# Patient Record
Sex: FEMALE | Race: WHITE | NOT HISPANIC OR LATINO | URBAN - NONMETROPOLITAN AREA
[De-identification: names, ages, dates, MRNs, and addresses within clinical notes are randomized per-mention and may not be internally consistent; named-entity substitution may affect disease eponyms.]

---

## 2017-07-19 PROBLEM — H52.4: Noted: 2017-07-19

## 2017-07-19 PROBLEM — H43.812: Noted: 2017-07-19

## 2017-07-19 PROBLEM — H40.1132: Noted: 2021-12-10

## 2017-07-19 PROBLEM — H35.3132: Noted: 2021-12-10

## 2017-07-19 PROBLEM — E11.3311: Noted: 2021-12-10

## 2019-01-03 ENCOUNTER — IMPORTED ENCOUNTER (OUTPATIENT)
Dept: URBAN - NONMETROPOLITAN AREA CLINIC 1 | Facility: CLINIC | Age: 84
End: 2019-01-03

## 2019-01-03 PROCEDURE — 92083 EXTENDED VISUAL FIELD XM: CPT

## 2019-01-03 PROCEDURE — 92014 COMPRE OPH EXAM EST PT 1/>: CPT

## 2019-01-03 NOTE — PATIENT DISCUSSION
POAG OU: moderate -  Discussed diagnosis in detail w/ pt today-  OCT of disc done previously OD stable with good RNFL. UTO good quality OCT OS. - VF done today OD Dulce no defects OS poor Non- Specific-  Optos done previously: stable cupping OU-  Gonio done previously: grade lV with light pigment OU-  IOP  today 15 OD aand 16 OS. -  Continue Latanoprost OU QHS-  Continue Combigan OU BID-  D/c Timolol at this time not controllign IOP well-  Cannot use Cosopt due to sulfa allergy-  Disc asymmetry noted with CDs of 0.55 OD and 0.4 OS. [facility] COD done previously was 18 OD and 558 OS. 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 -  Mild BDR noted OU but stable from previous. -  Stressed importance of blood sugar control and regularly monitoring with PCP-  Continue to monitor every 4-6 months. 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 -  Continue eye vitamins daily highly encouraged. -  Continue to use AG daily call or come in ASAP if changes in vision are noted from today. -  Continue to monitor every 4-6 months. Hyperopia/astig/presby-  Discussed refractive status in detail w/ pt-  MR done new GLRx given but update not required-  Continue to monitor yearly or PRNPVD and  descemet fold OS-  Discussed findings of exam in detail with the patient. -  The risk of retinal detachment in patients with PVDs was discussed with the patient and the warning signs of retinal detachment were carefully reviewed with the patient. -  The patient was warned to return to the office or contact the ophthalmologist on call immediately if they experience signs of retinal detachment or changes in vision noted from today.  -  continue to monitor; 's Notes:  9/5/18DFE  9/5/18Optos 4/20/18OCT disc  9/5/18OCT mac  12/15/17 9/5/18VF  1/3/19Gonio 4/20/18PACH 9/15/15 - [facility] 2/28/17 - no longer requiredStereo Disc

## 2019-06-18 ENCOUNTER — IMPORTED ENCOUNTER (OUTPATIENT)
Dept: URBAN - NONMETROPOLITAN AREA CLINIC 1 | Facility: CLINIC | Age: 84
End: 2019-06-18

## 2019-06-18 PROCEDURE — 92014 COMPRE OPH EXAM EST PT 1/>: CPT

## 2019-06-18 PROCEDURE — 92250 FUNDUS PHOTOGRAPHY W/I&R: CPT

## 2019-06-18 NOTE — PATIENT DISCUSSION
POAG OU: moderate -  Discussed diagnosis in detail w/ pt today-  OCT of disc done previously OD stable with good RNFL. UTO good quality OCT OS. - VF done previously OD Dulce no defects OS poor Non- Specific-  Optos done today: stable cupping OU-  Gonio done previously: grade lV with light pigment OU-  PACH done previously was 554 OD and 558 OS. -  IOP  today 13 OU-  Cup to Disc noted at OD 0.55 and OS 0.4  -  Continue Latanoprost OU QHS-  Continue Combigan OU BID-  D/C Timolol at this time not controllign IOP well-  Cannot use Cosopt due to sulfa allergy-  RTC 4 Months POAG ARMD w/ OCTType II DM dx 1980s w/ mod NPDR OU-  Discussed diagnosis in detail w/ pt today-  Dx 1980s controlled with oral medication-  Appears stable at this time. 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 -  Continue to monitor every 4-6 months. Hyperopia/astig/presby-  Discussed refractive status in detail w/ pt-  Continue to monitor yearly or PRNPVD and  descemet fold OS-  Discussed findings of exam in detail with the patient. -  The risk of retinal detachment in patients with PVDs was discussed with the patient and the warning signs of retinal detachment were carefully reviewed with the patient. -  The patient was warned to return to the office or contact the ophthalmologist on call immediately if they experience signs of retinal detachment or changes in vision noted from today.  -  Continue to monitor; 's Notes: MR 9/5/18DFE  6/18/19Optos 6/18/19OCT disc  9/5/18OCT mac  12/15/17 9/5/18VF  1/3/19Gonio 4/20/18PACH 9/15/15 - [facility] 2/28/17 - no longer requiredStereo Disc

## 2019-10-22 ENCOUNTER — IMPORTED ENCOUNTER (OUTPATIENT)
Dept: URBAN - NONMETROPOLITAN AREA CLINIC 1 | Facility: CLINIC | Age: 84
End: 2019-10-22

## 2019-10-22 PROCEDURE — 92014 COMPRE OPH EXAM EST PT 1/>: CPT

## 2019-10-22 PROCEDURE — 92134 CPTRZ OPH DX IMG PST SGM RTA: CPT

## 2019-10-22 NOTE — PATIENT DISCUSSION
POAG OU: moderate -  Discussed diagnosis in detail w/ pt today-  OCT of disc done previously OD stable with good RNFL. UTO good quality OCT OS. - VF done previously OD Dulce no defects OS poor Non- Specific-  Optos done previously: stable cupping OU-  Gonio done previously: grade lV with light pigment OU-  PACH done previously was 554 OD and 558 OS. -  IOP  today 13 OU-  Cup to Disc noted at OD 0.55 and OS 0.4  -  Continue Latanoprost OU QHS-  Continue Combigan OU BID-  D/C Timolol at this time not controllign IOP well-  Cannot use Cosopt due to sulfa allergy-  RTC 4 Months POAG ARMD w/ VFType II DM dx 1980s w/ mod NPDR OU-  Discussed diagnosis in detail w/ pt today-  Dx 1980s controlled with oral medication-  Appears stable at this time. 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 -  OCT done today shows Drusen OU but stable from previous -  Continue to monitor Hyperopia/astig/presby-  Discussed refractive status in detail w/ pt-  Continue to monitor yearly or PRNPVD and  descemet fold OS-  Discussed findings of exam in detail with the patient. -  The risk of retinal detachment in patients with PVDs was discussed with the patient and the warning signs of retinal detachment were carefully reviewed with the patient. -  The patient was warned to return to the office or contact the ophthalmologist on call immediately if they experience signs of retinal detachment or changes in vision noted from today.  -  Continue to monitor; 's Notes: MR 9/5/18DFE  6/18/19Optos 6/18/19OCT disc  9/5/18OCT mac  10/22/19VF  1/3/19Gonio 4/20/18PACH 9/15/15 - [facility] 2/28/17 - no longer requiredStereo Disc

## 2020-11-20 ENCOUNTER — IMPORTED ENCOUNTER (OUTPATIENT)
Dept: URBAN - NONMETROPOLITAN AREA CLINIC 1 | Facility: CLINIC | Age: 85
End: 2020-11-20

## 2020-11-20 PROCEDURE — 99213 OFFICE O/P EST LOW 20 MIN: CPT

## 2020-11-20 PROCEDURE — 92083 EXTENDED VISUAL FIELD XM: CPT

## 2020-11-20 NOTE — PATIENT DISCUSSION
POAG OU: moderate -  Discussed diagnosis in detail w/ pt today-  OCT of disc done previously OD stable with good RNFL. UTO good quality OCT OS. - VF done today OD Good reliability with borderline enlarged blind spot SO good with borderline central scotoma-  Optos done previously: stable cupping OU-  Gonio done previously: grade lV with light pigment OU-  PACH done previously was 554 OD and 558 OS. -  IOP  today 14 OD and 16 OS -  Cup to Disc noted at OD 0.55 and OS 0.4  -  Continue Latanoprost OU QHS-  Continue Combigan OU BID-  D/C Timolol at this time not controllign IOP well-  Cannot use Cosopt due to sulfa allergyType II DM dx 1980s w/ mod NPDR OU-  Discussed diagnosis in detail w/ pt today-  Dx 1980s controlled with oral medication-  Appears stable at this time. 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 -  OCT done previous shows Drusen OU but stable from previous -  Continue to monitor Hyperopia/astig/presby-  Discussed refractive status in detail w/ pt-  Continue to monitor yearly or PRNPVD and  descemet fold OS-  Discussed findings of exam in detail with the patient. -  The risk of retinal detachment in patients with PVDs was discussed with the patient and the warning signs of retinal detachment were carefully reviewed with the patient. -  The patient was warned to return to the office or contact the ophthalmologist on call immediately if they experience signs of retinal detachment or changes in vision noted from today.  -  Continue to monitor; 's Notes: MR 9/5/18DFE  6/18/19Optos 6/18/19OCT disc  9/5/18OCT mac  10/22/19VF  11/20/20Gonio 4/20/18PACH 9/15/15 - [facility] 2/28/17 - no longer requiredStereo Disc

## 2021-04-06 ENCOUNTER — IMPORTED ENCOUNTER (OUTPATIENT)
Dept: URBAN - NONMETROPOLITAN AREA CLINIC 1 | Facility: CLINIC | Age: 86
End: 2021-04-06

## 2021-04-06 PROCEDURE — 92133 CPTRZD OPH DX IMG PST SGM ON: CPT

## 2021-04-06 PROCEDURE — 99214 OFFICE O/P EST MOD 30 MIN: CPT

## 2021-04-06 PROCEDURE — 92015 DETERMINE REFRACTIVE STATE: CPT

## 2021-04-06 NOTE — PATIENT DISCUSSION
POAG OU: moderate -  Discussed diagnosis in detail w/ pt today-  OCT of disc done today OD shows Borderline Superiro NFLthinning and OS shows BorderlineSuperior and Nasal NFL Thinning - VF done previously OD Good reliability with borderline enlarged blind spot SO good with borderline central scotoma-  Optos done previously: stable cupping OU-  Gonio done previously: grade lV with light pigment OU-  PACH done previously was 554 OD and 558 OS. 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 -  OCT done previous shows Drusen OU but stable from previous -  Continue to monitor Hyperopia/astig/presby-  Discussed refractive status in detail w/ pt-  Continue to monitor yearly or PRNPVD and  descemet fold OS-  Discussed findings of exam in detail with the patient. -  The risk of retinal detachment in patients with PVDs was discussed with the patient and the warning signs of retinal detachment were carefully reviewed with the patient. -  The patient was warned to return to the office or contact the ophthalmologist on call immediately if they experience signs of retinal detachment or changes in vision noted from today.  -  Continue to monitor; 's Notes: MR 9/5/18DFE  6/18/19Optos 6/18/19OCT disc 4/6/21OCT mac  10/22/19VF  11/20/20Gonio 4/20/18PACH 9/15/15 - [facility] 2/28/17 - no longer requiredStereo Disc

## 2021-08-09 ENCOUNTER — IMPORTED ENCOUNTER (OUTPATIENT)
Dept: URBAN - NONMETROPOLITAN AREA CLINIC 1 | Facility: CLINIC | Age: 86
End: 2021-08-09

## 2021-08-09 PROCEDURE — 99213 OFFICE O/P EST LOW 20 MIN: CPT

## 2021-08-09 PROCEDURE — 92250 FUNDUS PHOTOGRAPHY W/I&R: CPT

## 2021-08-09 NOTE — PATIENT DISCUSSION
POAG OU: moderate -  Discussed diagnosis in detail w/ pt today-  OCT done previously OD shows Borderline Superiro NFLthinning and OS shows BorderlineSuperior and Nasal NFL Thinning - VF done previously OD Good reliability with borderline enlarged blind spot SO good with borderline central scotoma-  Optos done today: stable cupping OU-  Gonio done previously: grade lV with light pigment OU-  PACH done previously was 554 OD and 558 OS. 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 -  OCT done previous shows Drusen OU but stable from previous -  Continue to monitor Hyperopia/astig/presby-  Discussed refractive status in detail w/ pt-  Continue to monitor yearly or PRNPVD and  descemet fold OS-  Discussed findings of exam in detail with the patient. -  The risk of retinal detachment in patients with PVDs was discussed with the patient and the warning signs of retinal detachment were carefully reviewed with the patient. -  The patient was warned to return to the office or contact the ophthalmologist on call immediately if they experience signs of retinal detachment or changes in vision noted from today.  -  Continue to monitor; Dr's Notes: MR 9/5/18DFE  6/18/19Optos 8/9/21OCT disc 4/6/21OCT mac  10/22/19VF  11/20/20Gonio 4/20/18PACH 9/15/15 - [facility] 2/28/17 - no longer requiredStereo Disc

## 2021-12-10 ENCOUNTER — IMPORTED ENCOUNTER (OUTPATIENT)
Dept: URBAN - NONMETROPOLITAN AREA CLINIC 1 | Facility: CLINIC | Age: 86
End: 2021-12-10

## 2021-12-10 PROCEDURE — 92134 CPTRZ OPH DX IMG PST SGM RTA: CPT

## 2021-12-10 PROCEDURE — 99214 OFFICE O/P EST MOD 30 MIN: CPT

## 2021-12-10 NOTE — PATIENT DISCUSSION
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 -  OCT done today shows Drusen and thickening OU -  Recommend Retinal consult with [facility] patient agrees with plan -  Continue to monitor POAG OU: moderate -  Discussed diagnosis in detail w/ pt today-  OCT done previously OD shows Borderline Superiro NFLthinning and OS shows BorderlineSuperior and Nasal NFL Thinning - VF done previously OD Good reliability with borderline enlarged blind spot SO good with borderline central scotoma-  Optos done previously: stable cupping OU-  Gonio done previously: grade lV with light pigment OU-  PACH done previously was 554 OD and 558 OS. 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 -  The risk of retinal detachment in patients with PVDs was discussed with the patient and the warning signs of retinal detachment were carefully reviewed with the patient. -  The patient was warned to return to the office or contact the ophthalmologist on call immediately if they experience signs of retinal detachment or changes in vision noted from today.  -  Continue to monitor; 's Notes: MR 9/5/18DFE  6/18/19Optos 8/9/21OCT disc 4/6/21OCT mac  12/9/21VF  11/20/20Gonio 4/20/18PACH 9/15/15 - [facility] 2/28/17 - no longer requiredStereo Disc

## 2022-04-09 ASSESSMENT — VISUAL ACUITY
OS_SC: 20/400
OS_CC: CF4'
OS_SC: CF4'
OD_SC: 20/40+
OS_CC: CF4'
OS_SC: CF4'
OD_SC: 20/29-2
OD_SC: 20/30-
OS_SC: CF4'
OD_CC: 20/60-
OS_CC: 20/100+1
OD_CC: 20/40-2
OD_SC: 20/30-

## 2022-04-09 ASSESSMENT — PACHYMETRY
OD_CT_UM: 554; ADJ: THIN
OD_CT_UM: 554; ADJ: THIN
OS_CT_UM: 558; ADJ: WNL
OD_CT_UM: 554; ADJ: THIN
OD_CT_UM: 554; ADJ: THIN
OS_CT_UM: 558; ADJ: WNL
OD_CT_UM: 554; ADJ: THIN
OS_CT_UM: 558; ADJ: WNL
OD_CT_UM: 554; ADJ: THIN
OD_CT_UM: 554; ADJ: THIN
OS_CT_UM: 558; ADJ: WNL

## 2022-04-09 ASSESSMENT — TONOMETRY
OD_IOP_MMHG: 15
OD_IOP_MMHG: 13
OS_IOP_MMHG: 15
OD_IOP_MMHG: 13
OD_IOP_MMHG: 14
OS_IOP_MMHG: 13
OS_IOP_MMHG: 13
OD_IOP_MMHG: 14
OS_IOP_MMHG: 14
OD_IOP_MMHG: 13
OD_IOP_MMHG: 14
OS_IOP_MMHG: 16

## 2022-12-29 ENCOUNTER — ESTABLISHED PATIENT (OUTPATIENT)
Dept: URBAN - NONMETROPOLITAN AREA CLINIC 1 | Facility: CLINIC | Age: 87
End: 2022-12-29

## 2022-12-29 PROCEDURE — 92015 DETERMINE REFRACTIVE STATE: CPT

## 2022-12-29 PROCEDURE — 99214 OFFICE O/P EST MOD 30 MIN: CPT

## 2022-12-29 ASSESSMENT — VISUAL ACUITY
OS_CC: 20/400
OD_CC: 20/50-

## 2022-12-29 ASSESSMENT — TONOMETRY
OS_IOP_MMHG: 16
OD_IOP_MMHG: 16

## 2022-12-29 NOTE — PATIENT DISCUSSION
Another [employer] grid given 12/29/22 by Dr. Prashant Price with instructions on how to use. Patient o call or come into the office ASAP of any changes noted from today.

## 2022-12-29 NOTE — PATIENT DISCUSSION
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 Stressed the importance of strong glucose control. Advised the importance of at least yearly dilated examinations, but to contact us immediately for any problems or concerns.

## 2023-06-30 ENCOUNTER — FOLLOW UP (OUTPATIENT)
Dept: URBAN - NONMETROPOLITAN AREA CLINIC 1 | Facility: CLINIC | Age: 88
End: 2023-06-30

## 2023-06-30 DIAGNOSIS — H40.1132: ICD-10-CM

## 2023-06-30 DIAGNOSIS — H35.3132: ICD-10-CM

## 2023-06-30 DIAGNOSIS — E11.3393: ICD-10-CM

## 2023-06-30 DIAGNOSIS — H18.513: ICD-10-CM

## 2023-06-30 PROCEDURE — 99214 OFFICE O/P EST MOD 30 MIN: CPT

## 2023-06-30 PROCEDURE — 92133 CPTRZD OPH DX IMG PST SGM ON: CPT

## 2023-06-30 ASSESSMENT — VISUAL ACUITY: OD_CC: 20/80+1

## 2023-06-30 ASSESSMENT — TONOMETRY
OD_IOP_MMHG: 16
OS_IOP_MMHG: 16

## 2024-01-05 ENCOUNTER — ESTABLISHED PATIENT (OUTPATIENT)
Dept: URBAN - NONMETROPOLITAN AREA CLINIC 1 | Facility: CLINIC | Age: 89
End: 2024-01-05

## 2024-01-05 DIAGNOSIS — H40.1132: ICD-10-CM

## 2024-01-05 DIAGNOSIS — E11.3393: ICD-10-CM

## 2024-01-05 DIAGNOSIS — H35.3132: ICD-10-CM

## 2024-01-05 DIAGNOSIS — H18.513: ICD-10-CM

## 2024-01-05 DIAGNOSIS — H52.4: ICD-10-CM

## 2024-01-05 PROCEDURE — 92015 DETERMINE REFRACTIVE STATE: CPT

## 2024-01-05 PROCEDURE — 92134 CPTRZ OPH DX IMG PST SGM RTA: CPT

## 2024-01-05 PROCEDURE — 92014 COMPRE OPH EXAM EST PT 1/>: CPT

## 2024-01-05 ASSESSMENT — TONOMETRY
OD_IOP_MMHG: 16
OS_IOP_MMHG: 16

## 2024-01-05 ASSESSMENT — VISUAL ACUITY
OD_CC: 20/80-1
OD_PH: 20/70-1